# Patient Record
(demographics unavailable — no encounter records)

---

## 2025-03-11 NOTE — PHYSICAL EXAM
[Normal Breath Sounds] : Normal breath sounds [Normal Heart Sounds] : normal heart sounds [Abdomen Tenderness] : ~T ~M No abdominal tenderness [No Rash or Lesion] : No rash or lesion [Purpura] : purpura [Petechiae] : no petechiae [Skin Ulcer] : no ulcer [Skin Induration] : no induration [Alert] : alert [Oriented to Person] : oriented to person [Oriented to Place] : oriented to place [Oriented to Time] : oriented to time [Calm] : calm [de-identified] : wdwn  female  in NAD  [de-identified] : non icteric slcera  PERRLA EOMS intact  and  nl [de-identified] : trachea  midline [de-identified] : obese  soft   non tender no distension of  abdomen  all incision sites c/d/i  no  signs  of  cellulitis  no edema no erythema  no  signs  of  cellulitis    slight  bruising  surrounding  incision sites  no  necrosis  of skin no  guarding no  rebound [de-identified] : see   abdomen  exam

## 2025-03-11 NOTE — HISTORY OF PRESENT ILLNESS
[FreeTextEntry1] : Patient KRYSTAL KING [facility] MRN [id number] Hospital Visit  [facility] - Attending Physician Rafi Purvis  Status Complete      Hospital Course:  Discharge Date 25-Feb-2025    Admission Date 23-Feb-2025 21:16  Reason for Admission Acute appendicitis  Hospital Course   Patient was found to have Acute Appendicitis and was admitted to undergo  Laparoscopic Appendectomy.  Patient tolerated the procedure well and was  transferred to the floor in stable condition.  On POD #1, the patients diet was  advanced as tolerated and was placed on PO pain medication.  Once patient was  ambulating well, voiding without difficulty, and tolerating a full diet, they  were found to be stable for discharge to home.  Pain was well-controlled at  time of discharge.  Patient presents to [facility]  for  post op exam  At  time of  this e xam  patient  denies  any  fevers no chill  no  n/v/d    soft  stools  Denies  any acute physical complaint

## 2025-03-11 NOTE — ASSESSMENT
[FreeTextEntry1] : RTC prn  Discussion of  pathology results   F/U GI - due  to  age  of appendectomy  and  new set age  for  colonoscopy  Discussion with patient   All questions answered  Any acute symptoms and or concerns patient understands  to call back office  and  or  go  directly to [facility] ED

## 2025-03-11 NOTE — REVIEW OF SYSTEMS
[Abdominal Pain] : no abdominal pain [Vomiting] : no vomiting [Constipation] : no constipation [Diarrhea] : no diarrhea [Heartburn] : no heartburn [Negative] : Psychiatric [FreeTextEntry7] : s/p appendectomy